# Patient Record
Sex: MALE | Race: WHITE | Employment: STUDENT | URBAN - METROPOLITAN AREA
[De-identification: names, ages, dates, MRNs, and addresses within clinical notes are randomized per-mention and may not be internally consistent; named-entity substitution may affect disease eponyms.]

---

## 2021-05-08 ENCOUNTER — HOSPITAL ENCOUNTER (EMERGENCY)
Facility: HOSPITAL | Age: 26
Discharge: HOME | End: 2021-05-08
Attending: EMERGENCY MEDICINE
Payer: COMMERCIAL

## 2021-05-08 ENCOUNTER — APPOINTMENT (OUTPATIENT)
Dept: ULTRASOUND IMAGING | Facility: HOSPITAL | Age: 26
End: 2021-05-08
Attending: EMERGENCY MEDICINE
Payer: COMMERCIAL

## 2021-05-08 VITALS
DIASTOLIC BLOOD PRESSURE: 66 MMHG | HEIGHT: 73 IN | OXYGEN SATURATION: 98 % | SYSTOLIC BLOOD PRESSURE: 122 MMHG | RESPIRATION RATE: 18 BRPM | HEART RATE: 70 BPM | WEIGHT: 215 LBS | BODY MASS INDEX: 28.49 KG/M2 | TEMPERATURE: 98 F

## 2021-05-08 DIAGNOSIS — K80.20 CALCULUS OF GALLBLADDER WITHOUT CHOLECYSTITIS WITHOUT OBSTRUCTION: ICD-10-CM

## 2021-05-08 DIAGNOSIS — K80.50 BILIARY COLIC: Primary | ICD-10-CM

## 2021-05-08 PROCEDURE — 83690 ASSAY OF LIPASE: CPT | Performed by: EMERGENCY MEDICINE

## 2021-05-08 PROCEDURE — 76705 ECHO EXAM OF ABDOMEN: CPT | Performed by: EMERGENCY MEDICINE

## 2021-05-08 PROCEDURE — 85025 COMPLETE CBC W/AUTO DIFF WBC: CPT | Performed by: EMERGENCY MEDICINE

## 2021-05-08 PROCEDURE — 82248 BILIRUBIN DIRECT: CPT | Performed by: EMERGENCY MEDICINE

## 2021-05-08 PROCEDURE — 99284 EMERGENCY DEPT VISIT MOD MDM: CPT

## 2021-05-08 PROCEDURE — 80053 COMPREHEN METABOLIC PANEL: CPT | Performed by: EMERGENCY MEDICINE

## 2021-05-08 PROCEDURE — 36415 COLL VENOUS BLD VENIPUNCTURE: CPT

## 2021-05-08 NOTE — ED INITIAL ASSESSMENT (HPI)
Pt to rm 21 from triage and triaged at bedside for complaint of right upper quad and epigastric pain, states \"its my gallbladder\"

## 2021-05-08 NOTE — ED PROVIDER NOTES
Patient Seen in: [facility] AND [facility] Emergency Department      History   No chief complaint on file.     Stated Complaint: gallbladder is acting up    HPI/Subjective:   HPI    30 y/o male w/ intermittent RUQ pain w/ eating for some time now w/ worsening sym reviewed. Differential diagnosis includes biliary colic, choledocholithiasis, cholecystitis.       ED Course     Labs Reviewed   COMP METABOLIC PANEL (14) - Abnormal; Notable for the following components:       Result Value    AST 10 (*)     All other co was not elicited. BILE DUCTS:   Negative for dilatation. The common bile duct measures 0.3-0.4 cm. OTHER:   Visualized portions of the hepatic parenchyma are grossly unremarkable.  Sagittal survey images of the 12.2 cm length right kidney reveal no collect

## 2021-05-08 NOTE — CM/SW NOTE
Called and verified with Pt that he does have health insurance and will bring his insurance card to appointment.

## 2021-05-08 NOTE — CM/SW NOTE
Pt needs follow up next week with general surgery. Order placed for follow up services to be set up for Pt to f/u with [facility] surgery.

## 2021-05-10 ENCOUNTER — PATIENT OUTREACH (OUTPATIENT)
Dept: CASE MANAGEMENT | Age: 26
End: 2021-05-10

## 2021-05-10 NOTE — PROGRESS NOTES
1st attempt General Surgery apt request    [address]  [address]  [place], [place]   [phone]  Apt made for Tues.  May 11th @10am

## 2021-05-17 PROCEDURE — 88304 TISSUE EXAM BY PATHOLOGIST: CPT | Performed by: SURGERY

## 2024-01-05 ENCOUNTER — APPOINTMENT (OUTPATIENT)
Dept: CT IMAGING | Facility: HOSPITAL | Age: 29
End: 2024-01-05
Attending: EMERGENCY MEDICINE
Payer: COMMERCIAL

## 2024-01-05 ENCOUNTER — HOSPITAL ENCOUNTER (EMERGENCY)
Facility: HOSPITAL | Age: 29
Discharge: HOME | End: 2024-01-05
Attending: EMERGENCY MEDICINE
Payer: COMMERCIAL

## 2024-01-05 VITALS
WEIGHT: 260 LBS | RESPIRATION RATE: 16 BRPM | OXYGEN SATURATION: 98 % | BODY MASS INDEX: 34.46 KG/M2 | TEMPERATURE: 98 F | HEART RATE: 87 BPM | HEIGHT: 73 IN | DIASTOLIC BLOOD PRESSURE: 75 MMHG | SYSTOLIC BLOOD PRESSURE: 120 MMHG

## 2024-01-05 DIAGNOSIS — K52.9 ENTEROCOLITIS: Primary | ICD-10-CM

## 2024-01-05 LAB
ALBUMIN SERPL-MCNC: 4.3 G/DL (ref 3.2–4.8)
ALP LIVER SERPL-CCNC: 80 U/L
ALT SERPL-CCNC: 26 U/L
ANION GAP SERPL CALC-SCNC: 10 MMOL/L (ref 0–18)
AST SERPL-CCNC: 17 U/L (ref ?–34)
BASOPHILS # BLD AUTO: 0.06 X10(3) UL (ref 0–0.2)
BASOPHILS NFR BLD AUTO: 0.3 %
BILIRUB DIRECT SERPL-MCNC: 0.5 MG/DL (ref ?–0.3)
BILIRUB SERPL-MCNC: 2 MG/DL (ref 0.3–1.2)
BILIRUB UR QL: NEGATIVE
BUN BLD-MCNC: 15 MG/DL (ref 9–23)
BUN/CREAT SERPL: 20 (ref 10–20)
CALCIUM BLD-MCNC: 9.5 MG/DL (ref 8.7–10.4)
CHLORIDE SERPL-SCNC: 108 MMOL/L (ref 98–112)
CLARITY UR: CLEAR
CO2 SERPL-SCNC: 23 MMOL/L (ref 21–32)
COLOR UR: YELLOW
CREAT BLD-MCNC: 0.75 MG/DL
DEPRECATED RDW RBC AUTO: 38.5 FL (ref 35.1–46.3)
EGFRCR SERPLBLD CKD-EPI 2021: 125 ML/MIN/1.73M2 (ref 60–?)
EOSINOPHIL # BLD AUTO: 0.11 X10(3) UL (ref 0–0.7)
EOSINOPHIL NFR BLD AUTO: 0.6 %
ERYTHROCYTE [DISTWIDTH] IN BLOOD BY AUTOMATED COUNT: 11.9 % (ref 11–15)
GLUCOSE BLD-MCNC: 95 MG/DL (ref 70–99)
GLUCOSE UR-MCNC: NORMAL MG/DL
HCT VFR BLD AUTO: 48.1 %
HGB BLD-MCNC: 16.8 G/DL
IMM GRANULOCYTES # BLD AUTO: 0.12 X10(3) UL (ref 0–1)
IMM GRANULOCYTES NFR BLD: 0.6 %
KETONES UR-MCNC: NEGATIVE MG/DL
LEUKOCYTE ESTERASE UR QL STRIP.AUTO: NEGATIVE
LYMPHOCYTES # BLD AUTO: 1.18 X10(3) UL (ref 1–4)
LYMPHOCYTES NFR BLD AUTO: 6.2 %
MCH RBC QN AUTO: 31.2 PG (ref 26–34)
MCHC RBC AUTO-ENTMCNC: 34.9 G/DL (ref 31–37)
MCV RBC AUTO: 89.2 FL
MONOCYTES # BLD AUTO: 0.88 X10(3) UL (ref 0.1–1)
MONOCYTES NFR BLD AUTO: 4.6 %
NEUTROPHILS # BLD AUTO: 16.76 X10 (3) UL (ref 1.5–7.7)
NEUTROPHILS # BLD AUTO: 16.76 X10(3) UL (ref 1.5–7.7)
NEUTROPHILS NFR BLD AUTO: 87.7 %
NITRITE UR QL STRIP.AUTO: NEGATIVE
OSMOLALITY SERPL CALC.SUM OF ELEC: 293 MOSM/KG (ref 275–295)
PH UR: 5 [PH] (ref 5–8)
PLATELET # BLD AUTO: 272 10(3)UL (ref 150–450)
POTASSIUM SERPL-SCNC: 3.8 MMOL/L (ref 3.5–5.1)
PROT SERPL-MCNC: 7.5 G/DL (ref 5.7–8.2)
PROT UR-MCNC: NEGATIVE MG/DL
RBC # BLD AUTO: 5.39 X10(6)UL
SODIUM SERPL-SCNC: 141 MMOL/L (ref 136–145)
SP GR UR STRIP: 1.03 (ref 1–1.03)
UROBILINOGEN UR STRIP-ACNC: NORMAL
WBC # BLD AUTO: 19.1 X10(3) UL (ref 4–11)

## 2024-01-05 PROCEDURE — 99284 EMERGENCY DEPT VISIT MOD MDM: CPT

## 2024-01-05 PROCEDURE — 96361 HYDRATE IV INFUSION ADD-ON: CPT

## 2024-01-05 PROCEDURE — 74177 CT ABD & PELVIS W/CONTRAST: CPT | Performed by: EMERGENCY MEDICINE

## 2024-01-05 PROCEDURE — 80048 BASIC METABOLIC PNL TOTAL CA: CPT | Performed by: EMERGENCY MEDICINE

## 2024-01-05 PROCEDURE — 96374 THER/PROPH/DIAG INJ IV PUSH: CPT

## 2024-01-05 PROCEDURE — 96375 TX/PRO/DX INJ NEW DRUG ADDON: CPT

## 2024-01-05 PROCEDURE — 85025 COMPLETE CBC W/AUTO DIFF WBC: CPT | Performed by: EMERGENCY MEDICINE

## 2024-01-05 PROCEDURE — 80076 HEPATIC FUNCTION PANEL: CPT | Performed by: EMERGENCY MEDICINE

## 2024-01-05 PROCEDURE — 81001 URINALYSIS AUTO W/SCOPE: CPT | Performed by: EMERGENCY MEDICINE

## 2024-01-05 RX ORDER — ONDANSETRON 2 MG/ML
4 INJECTION INTRAMUSCULAR; INTRAVENOUS ONCE
Status: COMPLETED | OUTPATIENT
Start: 2024-01-05 | End: 2024-01-05

## 2024-01-05 RX ORDER — ONDANSETRON 8 MG/1
8 TABLET, ORALLY DISINTEGRATING ORAL EVERY 4 HOURS PRN
Qty: 10 TABLET | Refills: 0 | Status: SHIPPED | OUTPATIENT
Start: 2024-01-05 | End: 2024-01-12

## 2024-01-05 RX ORDER — KETOROLAC TROMETHAMINE 15 MG/ML
15 INJECTION, SOLUTION INTRAMUSCULAR; INTRAVENOUS ONCE
Status: COMPLETED | OUTPATIENT
Start: 2024-01-05 | End: 2024-01-05

## 2024-01-05 NOTE — ED PROVIDER NOTES
Patient Seen in: [facility] Emergency Department    History     Chief Complaint   Patient presents with    Abdominal Pain       HPI    28-year-old male here with generalized abdominal pain mostly in the periumbilical region for the last 3 hours with associated nausea and watery diarrhea.  Denies any fevers or recent travel or antibiotics or any blood in his stool.  No chest pain or dyspnea    History reviewed. No past medical history on file.    History reviewed.   Past Surgical History:   Procedure Laterality Date    CHOLECYSTECTOMY  05/17/2021         Medications :  (Not in a hospital admission)       No family history on file.    Smoking Status:   Social History     Socioeconomic History    Marital status: Single   Tobacco Use    Smoking status: Never    Smokeless tobacco: Never       Constitutional and vital signs reviewed.      Social History and Family History elements reviewed from today, pertinent positives to the presenting problem noted.    Physical Exam     ED Triage Vitals [01/05/24 0403]   /83   Pulse 98   Resp 18   Temp 98.1 °F (36.7 °C)   Temp src Temporal   SpO2 96 %   O2 Device None (Room air)       All measures to prevent infection transmission during my interaction with the patient were taken. The patient was already wearing a droplet mask on my arrival to the room. Personal protective equipment was worn throughout the duration of the exam.  Handwashing was performed prior to and after the exam.  Stethoscope and any equipment used during my examination was cleaned with super sani-cloth germicidal wipes following the exam.     Physical Exam    General: NAD  Head: Normocephalic and atraumatic.  Mouth/Throat/Ears/Nose: Oropharynx is clear   Eyes: Conjunctivae and EOM are normal.   Neck: Normal range of motion. Supple.   Cardiovascular: Normal rate, regular rhythm, normal heart sounds.  Respiratory/Chest: Clear and equal bilaterally. Exhibits no tenderness.  Gastrointestinal: Soft,  periumbilical tenderness, non-distended. Bowel sounds are normal.   Musculoskeletal:No swelling or deformity.   Neurological: Alert and appropriate. No focal deficits.   Skin: Skin is warm and dry. No pallor.  Psychiatric: Has a normal mood and affect.      ED Course        Labs Reviewed   HEPATIC FUNCTION PANEL (7) - Abnormal; Notable for the following components:       Result Value    Bilirubin, Total 2.0 (*)     Bilirubin, Direct 0.5 (*)     All other components within normal limits   URINALYSIS, ROUTINE - Abnormal; Notable for the following components:    Blood Urine Trace (*)     Squamous Epi. Cells Few (*)     All other components within normal limits   CBC W/ DIFFERENTIAL - Abnormal; Notable for the following components:    WBC 19.1 (*)     Neutrophil Absolute Prelim 16.76 (*)     Neutrophil Absolute 16.76 (*)     All other components within normal limits   BASIC METABOLIC PANEL (8) - Normal   CBC WITH DIFFERENTIAL WITH PLATELET    Narrative:     The following orders were created for panel order CBC With Differential With Platelet.                  Procedure                               Abnormality         Status                                     ---------                               -----------         ------                                     CBC W/ DIFFERENTIAL[[id number]]          Abnormal            Final result                                                 Please view results for these tests on the individual orders.       As Interpreted by me    Imaging Results Available and Reviewed while in ED: No results found.  ED Medications Administered:   Medications   sodium chloride 0.9 % IV bolus 1,000 mL (0 mL Intravenous Stopped 1/5/24 0668)   ketorolac (Toradol) 15 MG/ML injection 15 mg (15 mg Intravenous Given 1/5/24 5226)   ondansetron (Zofran) 4 MG/2ML injection 4 mg (4 mg Intravenous Given 1/5/24 8436)   iopamidol 76% (ISOVUE-370) injection for power injector (80 mL Intravenous Given 1/5/24  0531)         [facility]     Vitals:    01/05/24 0500 01/05/24 0530 01/05/24 0541 01/05/24 0615   BP: 106/70 122/72  120/75   Pulse: 86 84  87   Resp:  18  16   Temp:       TempSrc:       SpO2: 92% (!) 88% 98% 98%   Weight:       Height:         *I personally reviewed and interpreted all ED vitals.    Pulse Ox: 98%, Room air, Normal     Monitor Interpretation:   normal sinus rhythm as interpreted by me.  The cardiac monitor was ordered given concern for electrolyte derangements.      Medical Decision Making      Differential Diagnosis/ Diagnostic Considerations: Gastroenteritis, appendicitis, colitis    Complicating Factors: The patient already has does not have a problem list on file. to contribute to the complexity of this ED evaluation.    I reviewed prior chart records including outpatient visit note from June 28, 2023.  Patient here with generalized abdominal pain, nausea and diarrhea, CT abdomen pelvis without any evidence of appendicitis.  There is evidence of enteric colitis.  Patient is feeling improved after supportive medications.  Labs reviewed and unremarkable for acute findings on my interpretation aside from nonspecific leukocytosis.  We discussed strict return precautions.    Discharged in stable condition.  Patient is comfortable with the plan.    Prescriptions: Zofran ODT for nausea    Preliminary Radiology Report  [facility], [facility]      CT ABDOMEN AND PELVIS WITH CONTRAST    No prior    IMPRESSION:  Borderline appendix diameter without signs of appendicitis.  Fluid content within the colon with borderline thickening.  Fluid distended distal small bowel loops.  Findings may suggest enterocolitis.  No signs of bowel obstruction.  No pneumatosis or free air.  Gallbladder is surgically absent.  No bile duct dilation.  No obstructive urinary calculi.  No hydronephrosis.  Bibasilar subsegmental atelectatic changes.  Relative elevation of left hemidiaphragm.     Report sent at 7:27 AM ET    Soledad Spaulding  MD [facility]    Disposition and Plan     Clinical Impression:  1. Enterocolitis        Disposition:  Discharge    Follow-up:  Bre Galloway MD  [facility] [address] [id number] [phone]    Schedule an appointment as soon as possible for a visit in 1 day(s)        Medications Prescribed:  Discharge Medication List as of 1/5/2024  6:56 AM        START taking these medications    Details   ondansetron 8 MG Oral Tablet Dispersible Take 1 tablet (8 mg total) by mouth every 4 (four) hours as needed for Nausea., Normal, Disp-10 tablet, R-0